# Patient Record
Sex: FEMALE | Race: WHITE | NOT HISPANIC OR LATINO
[De-identification: names, ages, dates, MRNs, and addresses within clinical notes are randomized per-mention and may not be internally consistent; named-entity substitution may affect disease eponyms.]

---

## 2018-10-19 ENCOUNTER — TRANSCRIPTION ENCOUNTER (OUTPATIENT)
Age: 83
End: 2018-10-19

## 2018-10-20 ENCOUNTER — EMERGENCY (EMERGENCY)
Facility: HOSPITAL | Age: 83
LOS: 1 days | Discharge: HOME | End: 2018-10-20
Attending: EMERGENCY MEDICINE
Payer: MEDICARE

## 2018-10-20 ENCOUNTER — TRANSCRIPTION ENCOUNTER (OUTPATIENT)
Age: 83
End: 2018-10-20

## 2018-10-20 VITALS
TEMPERATURE: 98 F | RESPIRATION RATE: 18 BRPM | SYSTOLIC BLOOD PRESSURE: 177 MMHG | DIASTOLIC BLOOD PRESSURE: 77 MMHG | WEIGHT: 134.92 LBS | HEIGHT: 62.5 IN | OXYGEN SATURATION: 97 % | HEART RATE: 95 BPM

## 2018-10-20 DIAGNOSIS — Z98.89 OTHER SPECIFIED POSTPROCEDURAL STATES: Chronic | ICD-10-CM

## 2018-10-20 DIAGNOSIS — Z98.41 CATARACT EXTRACTION STATUS, RIGHT EYE: Chronic | ICD-10-CM

## 2018-10-20 PROCEDURE — 99283 EMERGENCY DEPT VISIT LOW MDM: CPT | Mod: 25

## 2018-10-20 PROCEDURE — 90471 IMMUNIZATION ADMIN: CPT

## 2018-10-20 PROCEDURE — 99283 EMERGENCY DEPT VISIT LOW MDM: CPT | Mod: GC

## 2018-10-20 PROCEDURE — 90715 TDAP VACCINE 7 YRS/> IM: CPT

## 2018-10-20 RX ORDER — ATORVASTATIN CALCIUM 80 MG/1
1 TABLET, FILM COATED ORAL
Qty: 0 | Refills: 0 | COMMUNITY

## 2018-10-20 RX ORDER — TETANUS TOXOID, REDUCED DIPHTHERIA TOXOID AND ACELLULAR PERTUSSIS VACCINE, ADSORBED 5; 2.5; 8; 8; 2.5 [IU]/.5ML; [IU]/.5ML; UG/.5ML; UG/.5ML; UG/.5ML
0.5 SUSPENSION INTRAMUSCULAR ONCE
Qty: 0 | Refills: 0 | Status: COMPLETED | OUTPATIENT
Start: 2018-10-20 | End: 2018-10-20

## 2018-10-20 RX ORDER — MULTIVIT-MIN/FERROUS GLUCONATE 9 MG/15 ML
1 LIQUID (ML) ORAL
Qty: 0 | Refills: 0 | COMMUNITY

## 2018-10-20 RX ORDER — CLOPIDOGREL BISULFATE 75 MG/1
1 TABLET, FILM COATED ORAL
Qty: 0 | Refills: 0 | COMMUNITY

## 2018-10-20 RX ADMIN — TETANUS TOXOID, REDUCED DIPHTHERIA TOXOID AND ACELLULAR PERTUSSIS VACCINE, ADSORBED 0.5 MILLILITER(S): 5; 2.5; 8; 8; 2.5 SUSPENSION INTRAMUSCULAR at 19:54

## 2018-10-20 NOTE — ED PROVIDER NOTE - PROGRESS NOTE DETAILS
Kevin Arnett DO PGY1 - Nonbleeding wound. Surgicel and bacitracin placed on top of wound and gauze applied. Pt educated to change dressing everyday and keep a layer of ointment on skin to prevent tearing open wound again.

## 2018-10-20 NOTE — ED PROVIDER NOTE - OBJECTIVE STATEMENT
85yo F hx mitral valve replacement on plavix here after scraping her arm on a door lock yesterday. Pt went to urgent care d/t concern of it not stopping bleeding and was given surgicel?? and a mupirocin ointment to put around the area. Pt states she went to take the bandage off today and it reopened the wound and it started bleeding again. Otherwise no lightheadedness, cp, sob, dark stools, or any other complaints. 85yo F hx mitral valve replacement on plavix here after scraping her arm on a door lock yesterday. Pt went to urgent care d/t concern of it not stopping bleeding and was given surgicel?? and a mupirocin ointment to put around the area. Pt states she went to take the bandage off today and it reopened the wound and it started bleeding again. Otherwise no lightheadedness, cp, sob, dark stools, or any other complaints.    Attendinyo female presents with bleeding from skin tear on left forearm.  Was seen at urgent care and had wound dressed yesterday.  today had bleeding when she changed the dressing.  no pain

## 2018-10-20 NOTE — ED PROVIDER NOTE - PLAN OF CARE
My diagnosis was explained to me by Dr. Arnett. Rest, drink plenty of fluids.  Advance activity as tolerated.  Continue all previously prescribed medications as directed.  Follow up with your primary care physician in 24-48 hours- bring copies of your results if you were given. If you do not have a primary care physician, call our clinic at [phone], or call [phone].  Return to the Emergency Department for worsening or persistent symptoms OR ANY NEW OR CONCERNING SYMPTOMS including but not limited to saturating the dressing with blood, lightheadedness, chest pain, shortness fo breath, or dark stools. PLEASE follow up in 24-48 hours as discussed to have reevaluation of wound.

## 2018-10-20 NOTE — ED PROVIDER NOTE - CARE PLAN
Principal Discharge DX:	Bleeding from wound  Assessment and plan of treatment:	My diagnosis was explained to me by Dr. Arnett. Rest, drink plenty of fluids.  Advance activity as tolerated.  Continue all previously prescribed medications as directed.  Follow up with your primary care physician in 24-48 hours- bring copies of your results if you were given. If you do not have a primary care physician, call our clinic at [phone], or call [phone].  Return to the Emergency Department for worsening or persistent symptoms OR ANY NEW OR CONCERNING SYMPTOMS including but not limited to saturating the dressing with blood, lightheadedness, chest pain, shortness fo breath, or dark stools. PLEASE follow up in 24-48 hours as discussed to have reevaluation of wound.

## 2018-10-20 NOTE — ED ADULT NURSE NOTE - PSH
H/O cataract extraction, right    H/O colonoscopy    H/O mitral valve repair  Bioprosthetic mitral valve reolacement.  History of tonsillectomy    S/P thoracentesis

## 2018-10-20 NOTE — ED ADULT NURSE REASSESSMENT NOTE - NS ED NURSE REASSESS COMMENT FT1
Dressing removed by MD, skin tear of left forearm, approx 1cm, no bleeding, no discharge. Wound is being redressed at this time.

## 2018-10-20 NOTE — ED ADULT NURSE NOTE - OBJECTIVE STATEMENT
83 y/o female, a&o x3, c/o skin tear of left forearm yesterday, scraped on cabinet latch in bathroom. Pt takes plavix. Treated at urgent care twice, bleeding returned. Bleeding controlled in triage with pressure dressing. Unsure of last tetanus shot. Denies headache, weakness, dizziness, fevers, chills, or chest pains. Breathing even, full, unlabored, no cough, no SOB. No nausea/vomiting. Pressure dressing of left forearm in place, bleeding controlled, brisk cap refill and strong distal pulses. Stretcher locked in low position. Advised of plan of care. 83 y/o female, a&o x3, c/o skin tear of left forearm yesterday, scraped on door latch in bathroom. Pt takes plavix. Treated at urgent care twice, bleeding returned. Bleeding controlled in triage with pressure dressing. Unsure of last tetanus shot. Denies headache, weakness, dizziness, fevers, chills, or chest pains. Breathing even, full, unlabored, no cough, no SOB. No nausea/vomiting. Pressure dressing of left forearm in place, bleeding controlled, brisk cap refill and strong distal pulses. Stretcher locked in low position. Advised of plan of care. 85 y/o female, a&o x3, c/o skin tear of left forearm yesterday, scraped on door latch in bathroom. Pt takes plavix. Treated at urgent care twice, bleeding returned, prescribed mupirocin 2% ointment. Bleeding controlled in triage with pressure dressing. Unsure of last tetanus shot. Denies headache, weakness, dizziness, fevers, chills, or chest pains. Breathing even, full, unlabored, no cough, no SOB. No nausea/vomiting. Pressure dressing of left forearm in place, bleeding controlled, brisk cap refill and strong distal pulses. Stretcher locked in low position. Advised of plan of care.

## 2018-10-20 NOTE — ED PROVIDER NOTE - MEDICAL DECISION MAKING DETAILS
Bleeding wound nonbleeding currently. No signs of anemia. Change dressing and educate on how to change dressing without reopening wound again.
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
## 2018-10-20 NOTE — ED PROVIDER NOTE - PHYSICAL EXAMINATION
Gen: Well appearing, NAD  Head: NCAT  HEENT: PERRL, MMM, normal conjunctiva, anicteric, neck supple  Lung: CTAB, no adventitious sounds  CV: RRR, no murmurs  Abd: soft, NTND, no rebound or guarding, no CVAT  MSK: No edema, no visible deformities  Neuro: CN II-XII grossly intact. 5/5 strength and normal sensation in all extremities. Ambulatory with stable gait.   Skin: 1cm triangular skin flap with nonbleeding wound and bloody but not saturated dressing on L forearm.   Psych: normal mood and affect

## 2018-10-21 ENCOUNTER — EMERGENCY (EMERGENCY)
Facility: HOSPITAL | Age: 83
LOS: 1 days | Discharge: HOME | End: 2018-10-21
Attending: EMERGENCY MEDICINE
Payer: MEDICARE

## 2018-10-21 VITALS
TEMPERATURE: 98 F | WEIGHT: 134.92 LBS | HEIGHT: 62 IN | OXYGEN SATURATION: 98 % | SYSTOLIC BLOOD PRESSURE: 195 MMHG | DIASTOLIC BLOOD PRESSURE: 80 MMHG | RESPIRATION RATE: 19 BRPM | HEART RATE: 80 BPM

## 2018-10-21 DIAGNOSIS — Z98.41 CATARACT EXTRACTION STATUS, RIGHT EYE: Chronic | ICD-10-CM

## 2018-10-21 DIAGNOSIS — Z98.89 OTHER SPECIFIED POSTPROCEDURAL STATES: Chronic | ICD-10-CM

## 2018-10-21 PROCEDURE — G0463: CPT

## 2018-10-21 NOTE — ED ADULT TRIAGE NOTE - CHIEF COMPLAINT QUOTE
L forearm skin tear (hit arm against door), was seen here --> given tdap, "afraid to take off dressing"

## 2018-10-21 NOTE — ED PROVIDER NOTE - OBJECTIVE STATEMENT
84 year old F Anxiety, Bronchitis, Chronic congestive heart failure, Depression, Diverticulosis presents for wound check s/p sustaining abrasion to L lateral forearm yesterday. Patient has not changed dressing. Denies any fevers, chills, bleeding through wrap. Tdap updated yesterday.

## 2018-10-21 NOTE — ED PROVIDER NOTE - MEDICAL DECISION MAKING DETAILS
84 year old F PMH non-contributory presenting for wound check of L forearm. Looks well. Will re-dress. D/c to home with return precautions. 84 year old F PMH non-contributory presenting for wound check of L forearm. Looks well. Will re-dress. D/c to home with return precautions.  --BMM

## 2018-10-21 NOTE — ED ADULT NURSE NOTE - OBJECTIVE STATEMENT
pt states, 'I banged my left arm on the door lock yesterday and I got bandaged up and it looked good. I'm coming in to get it looked at." no active bleeding noted at present. pt denies any pain, itchiness, or d/c from wound.

## 2018-10-21 NOTE — ED PROVIDER NOTE - PLAN OF CARE
You were seen today for a wound check. Please keep this dressing on for 24 hours. You can then remove it and put a band aid to avoid disturbing the wound. Please put on the mupirocin 3 times per day for 5 days.   If you notice purulent drainage from wound, develop redness tracking up or down the arm, develop fevers or chills, please come back to the emergency room.

## 2018-10-21 NOTE — ED PROVIDER NOTE - NS ED ROS FT
General: denies fever, chills.  HENT: denies nasal congestion, sore throat, rhinorrhea, ear pain  Eyes: denies visual changes  Neck: denies neck pain, neck swelling  CV: denies chest pain, palpitations  Resp: denies difficulty breathing, cough  Abdominal: denies nausea, vomiting, diarrhea, abdominal pain  MSK: denies muscle aches, bony pain, leg pain, leg swelling  Neuro: denies headaches, numbness, tingling, dizziness, lightheadedness.  Skin: +abrasion

## 2018-10-21 NOTE — ED PROVIDER NOTE - CARE PLAN
Principal Discharge DX:	Visit for wound check  Assessment and plan of treatment:	You were seen today for a wound check. Please keep this dressing on for 24 hours. You can then remove it and put a band aid to avoid disturbing the wound. Please put on the mupirocin 3 times per day for 5 days.   If you notice purulent drainage from wound, develop redness tracking up or down the arm, develop fevers or chills, please come back to the emergency room.

## 2018-10-21 NOTE — ED PROVIDER NOTE - PHYSICAL EXAMINATION
General appearance: NAD, conversant, afebrile   Eyes: anicteric sclerae, moist conjunctivae; no lid-lag; Pupils equal, round and reactive to light; Extraocular muscles intact  HENT: Atraumatic;    Extremities: L forearm: +1.2 cm triangular abrasion, well approximated. No active bleeding. No surrounding erythema or purulent drainage from wound.    Skin: See skin exam.

## 2020-07-22 ENCOUNTER — APPOINTMENT (OUTPATIENT)
Dept: OPHTHALMOLOGY | Facility: CLINIC | Age: 85
End: 2020-07-22
Payer: MEDICARE

## 2020-07-22 ENCOUNTER — NON-APPOINTMENT (OUTPATIENT)
Age: 85
End: 2020-07-22

## 2020-07-22 PROCEDURE — 99212 OFFICE O/P EST SF 10 MIN: CPT | Mod: 95

## 2020-08-28 ENCOUNTER — NON-APPOINTMENT (OUTPATIENT)
Age: 85
End: 2020-08-28

## 2020-08-28 ENCOUNTER — APPOINTMENT (OUTPATIENT)
Dept: OPHTHALMOLOGY | Facility: CLINIC | Age: 85
End: 2020-08-28
Payer: MEDICARE

## 2020-08-28 PROCEDURE — 99212 OFFICE O/P EST SF 10 MIN: CPT | Mod: 95

## 2020-09-25 ENCOUNTER — NON-APPOINTMENT (OUTPATIENT)
Age: 85
End: 2020-09-25

## 2020-09-25 ENCOUNTER — APPOINTMENT (OUTPATIENT)
Dept: OPHTHALMOLOGY | Facility: CLINIC | Age: 85
End: 2020-09-25
Payer: MEDICARE

## 2020-09-25 PROCEDURE — 92012 INTRM OPH EXAM EST PATIENT: CPT

## 2020-12-15 ENCOUNTER — APPOINTMENT (OUTPATIENT)
Dept: OPHTHALMOLOGY | Facility: CLINIC | Age: 85
End: 2020-12-15

## 2021-07-18 ENCOUNTER — EMERGENCY (EMERGENCY)
Facility: HOSPITAL | Age: 86
LOS: 1 days | Discharge: HOME | End: 2021-07-18
Attending: EMERGENCY MEDICINE
Payer: MEDICARE

## 2021-07-18 VITALS
HEART RATE: 85 BPM | TEMPERATURE: 98 F | SYSTOLIC BLOOD PRESSURE: 193 MMHG | WEIGHT: 141.98 LBS | OXYGEN SATURATION: 98 % | RESPIRATION RATE: 18 BRPM | DIASTOLIC BLOOD PRESSURE: 82 MMHG | HEIGHT: 62 IN

## 2021-07-18 DIAGNOSIS — Z98.89 OTHER SPECIFIED POSTPROCEDURAL STATES: Chronic | ICD-10-CM

## 2021-07-18 DIAGNOSIS — Z98.41 CATARACT EXTRACTION STATUS, RIGHT EYE: Chronic | ICD-10-CM

## 2021-07-18 LAB
ALBUMIN SERPL ELPH-MCNC: 4.6 G/DL — SIGNIFICANT CHANGE UP (ref 3.3–5)
ALP SERPL-CCNC: 123 U/L — HIGH (ref 40–120)
ALT FLD-CCNC: 27 U/L — SIGNIFICANT CHANGE UP (ref 10–45)
ANION GAP SERPL CALC-SCNC: 12 MMOL/L — SIGNIFICANT CHANGE UP (ref 5–17)
APTT BLD: 37 SEC — HIGH (ref 27.5–35.5)
AST SERPL-CCNC: 25 U/L — SIGNIFICANT CHANGE UP (ref 10–40)
BASOPHILS # BLD AUTO: 0.02 K/UL — SIGNIFICANT CHANGE UP (ref 0–0.2)
BASOPHILS NFR BLD AUTO: 0.3 % — SIGNIFICANT CHANGE UP (ref 0–2)
BILIRUB SERPL-MCNC: 0.7 MG/DL — SIGNIFICANT CHANGE UP (ref 0.2–1.2)
BUN SERPL-MCNC: 12 MG/DL — SIGNIFICANT CHANGE UP (ref 7–23)
CALCIUM SERPL-MCNC: 9.2 MG/DL — SIGNIFICANT CHANGE UP (ref 8.4–10.5)
CHLORIDE SERPL-SCNC: 90 MMOL/L — LOW (ref 96–108)
CO2 SERPL-SCNC: 26 MMOL/L — SIGNIFICANT CHANGE UP (ref 22–31)
CREAT SERPL-MCNC: 0.6 MG/DL — SIGNIFICANT CHANGE UP (ref 0.5–1.3)
EOSINOPHIL # BLD AUTO: 0.08 K/UL — SIGNIFICANT CHANGE UP (ref 0–0.5)
EOSINOPHIL NFR BLD AUTO: 1 % — SIGNIFICANT CHANGE UP (ref 0–6)
GLUCOSE SERPL-MCNC: 127 MG/DL — HIGH (ref 70–99)
HCT VFR BLD CALC: 38.2 % — SIGNIFICANT CHANGE UP (ref 34.5–45)
HGB BLD-MCNC: 12.9 G/DL — SIGNIFICANT CHANGE UP (ref 11.5–15.5)
IMM GRANULOCYTES NFR BLD AUTO: 0.4 % — SIGNIFICANT CHANGE UP (ref 0–1.5)
INR BLD: 1.02 RATIO — SIGNIFICANT CHANGE UP (ref 0.88–1.16)
LYMPHOCYTES # BLD AUTO: 0.89 K/UL — LOW (ref 1–3.3)
LYMPHOCYTES # BLD AUTO: 11.5 % — LOW (ref 13–44)
MCHC RBC-ENTMCNC: 31.1 PG — SIGNIFICANT CHANGE UP (ref 27–34)
MCHC RBC-ENTMCNC: 33.8 GM/DL — SIGNIFICANT CHANGE UP (ref 32–36)
MCV RBC AUTO: 92 FL — SIGNIFICANT CHANGE UP (ref 80–100)
MONOCYTES # BLD AUTO: 0.58 K/UL — SIGNIFICANT CHANGE UP (ref 0–0.9)
MONOCYTES NFR BLD AUTO: 7.5 % — SIGNIFICANT CHANGE UP (ref 2–14)
NEUTROPHILS # BLD AUTO: 6.16 K/UL — SIGNIFICANT CHANGE UP (ref 1.8–7.4)
NEUTROPHILS NFR BLD AUTO: 79.3 % — HIGH (ref 43–77)
NRBC # BLD: 0 /100 WBCS — SIGNIFICANT CHANGE UP (ref 0–0)
NT-PROBNP SERPL-SCNC: 861 PG/ML — HIGH (ref 0–300)
PLATELET # BLD AUTO: 273 K/UL — SIGNIFICANT CHANGE UP (ref 150–400)
POTASSIUM SERPL-MCNC: 4.3 MMOL/L — SIGNIFICANT CHANGE UP (ref 3.5–5.3)
POTASSIUM SERPL-SCNC: 4.3 MMOL/L — SIGNIFICANT CHANGE UP (ref 3.5–5.3)
PROT SERPL-MCNC: 7.7 G/DL — SIGNIFICANT CHANGE UP (ref 6–8.3)
PROTHROM AB SERPL-ACNC: 12.2 SEC — SIGNIFICANT CHANGE UP (ref 10.6–13.6)
RBC # BLD: 4.15 M/UL — SIGNIFICANT CHANGE UP (ref 3.8–5.2)
RBC # FLD: 12.4 % — SIGNIFICANT CHANGE UP (ref 10.3–14.5)
SODIUM SERPL-SCNC: 128 MMOL/L — LOW (ref 135–145)
TROPONIN T, HIGH SENSITIVITY RESULT: 13 NG/L — SIGNIFICANT CHANGE UP (ref 0–51)
WBC # BLD: 7.76 K/UL — SIGNIFICANT CHANGE UP (ref 3.8–10.5)
WBC # FLD AUTO: 7.76 K/UL — SIGNIFICANT CHANGE UP (ref 3.8–10.5)

## 2021-07-18 PROCEDURE — 71046 X-RAY EXAM CHEST 2 VIEWS: CPT | Mod: 26

## 2021-07-18 PROCEDURE — 99285 EMERGENCY DEPT VISIT HI MDM: CPT | Mod: GC

## 2021-07-18 RX ORDER — ACETAMINOPHEN 500 MG
650 TABLET ORAL ONCE
Refills: 0 | Status: DISCONTINUED | OUTPATIENT
Start: 2021-07-18 | End: 2021-07-22

## 2021-07-18 RX ORDER — LANOLIN ALCOHOL/MO/W.PET/CERES
5 CREAM (GRAM) TOPICAL ONCE
Refills: 0 | Status: COMPLETED | OUTPATIENT
Start: 2021-07-18 | End: 2021-07-18

## 2021-07-18 NOTE — ED PROVIDER NOTE - NS ED ROS FT
GENERAL: No fever or chills  EYES: No change in vision  HEENT: No trouble swallowing or speaking  CARDIAC: No chest pain  PULMONARY: No cough or SOB  GI: No abdominal pain, no nausea or no vomiting, no diarrhea or constipation  : No changes in urination  SKIN: No rashes  NEURO: No headache, no numbness  MSK: No joint pain  PSYCH: +anxiety, +agitation  Otherwise as HPI or negative.

## 2021-07-18 NOTE — ED PROVIDER NOTE - PHYSICAL EXAMINATION
Gen: In mild distress. A&Ox4. Non-toxic appearing.  HEENT: Normocephalic and atraumatic. PERRL, EOMI, no nasal discharge, mucous membranes moist, no scleral icterus.  CV: Regular rate and rhythm, +S1/S2, no M/R/G. No significant lower extremity edema. Radial and DP pulses present and symmetrical. Capillary refill less than 2 seconds.  Resp: Normal effort and rate. CTAB, no rales, rhonchi, or wheezes.  GI: Abdomen soft, non-distended, non tender to palpation. No masses appreciated. Bowel sounds present.  MSK: No open wounds and no bruising. No CVA tenderness bilaterally.  Neuro: Following commands, speaking in full sentences, moving extremities spontaneously. CN II-XII intact. Strength and sensation symmetric and intact throughout. Reflexes 2+ throughout. Cerebellar testing normal.  Psych: Anxious mood, cooperative

## 2021-07-18 NOTE — ED PROVIDER NOTE - NSFOLLOWUPINSTRUCTIONS_ED_ALL_ED_FT
Please stop taking the Zoloft until you see your primary care physician. It is possible that the medicine is making you feel more anxious and agitated.    Please followup with your primary care physician as soon as possible to discuss your medications.    Your sodium is a bit low (127). Please continue to stay hydrated and drink lots of fluids at home.    Anxiety    Generalized anxiety disorder (AR) is a mental disorder. It is defined as anxiety that is not necessarily related to specific events or activities or is out of proportion to said events. Symptoms include restlessness, fatigue, difficulty concentrations, irritability and difficulty concentrating. It may interfere with life functions, including relationships, work, and school. If you were started on a medication, make sure to take exactly as prescribed and follow up with a psychiatrist.    SEEK IMMEDIATE MEDICAL CARE IF YOU HAVE ANY OF THE FOLLOWING SYMPTOMS: thoughts about hurting killing yourself, thoughts about hurting or killing somebody else, hallucinations, or worsening depression.

## 2021-07-18 NOTE — ED PROVIDER NOTE - OBJECTIVE STATEMENT
The patient is a 87y Female with pmhx of paroxysmal afib, hyponatremia, mitral valve stenosis with repair, HTN, and CHF complaining of anxiety. Says that she was started on Zoloft by her PMD 2 days ago for worsening anxiety. Today, around 5 pm she started to feel very anxious and agitate and "didn't feel right". So she came to ED for further eval. Denies any CP, SOB, abd pain, leg swelling, fevers, palpitations. Denies any SI, HI, or hallucinations. Allergic to doxy and PCN.

## 2021-07-18 NOTE — ED PROVIDER NOTE - CLINICAL SUMMARY MEDICAL DECISION MAKING FREE TEXT BOX
YESSICA Acosta MD: Pt is an 86 y/o female with PMH anxiety, depression, CHF, HTN, HLD, hyponatremia, PAF on ASA and plavix, pulmonary HTN, PMR, mitral valve stenosis who is BIB son for increased anxiety and discomfort to her head, 1 day s/p starting zoloft. Pt states that today, she began to feel a band-like pressure around her head, not a pain. No trauma. No FNDs. No f/c. Suspect medication s/e as cause of her sx, however, will check basic labs, u/a, ucx, CTH and will reassess. Will likely recommend d/c zoloft and f/u with PCP for further mgt of her anxiety

## 2021-07-18 NOTE — ED ADULT NURSE NOTE - OBJECTIVE STATEMENT
87 y f presents BIBEMS with "anxiety x 2 hours," PTA. Reports to being started on Zoloft- 3 days in. Reports to "weird feeling throughout body." Denies CP, SOB, N/V/D, pain/ difficulty walking/ speaking. A&OX3. Skin warm dry and intact. Breathing comfortably in bed- no distress. Abdomen soft nondistended. Safety maintained- bed locked in lowest position.

## 2021-07-18 NOTE — ED PROVIDER NOTE - PATIENT PORTAL LINK FT
You can access the FollowMyHealth Patient Portal offered by [facility] by registering at the following website: [email or web address]. By joining [facility]’s FollowMyHealth portal, you will also be able to view your health information using other applications (apps) compatible with our system.

## 2021-07-19 VITALS
HEART RATE: 78 BPM | SYSTOLIC BLOOD PRESSURE: 123 MMHG | OXYGEN SATURATION: 97 % | RESPIRATION RATE: 20 BRPM | DIASTOLIC BLOOD PRESSURE: 56 MMHG | TEMPERATURE: 98 F

## 2021-07-19 LAB
ANION GAP SERPL CALC-SCNC: 11 MMOL/L — SIGNIFICANT CHANGE UP (ref 5–17)
APPEARANCE UR: CLEAR — SIGNIFICANT CHANGE UP
BACTERIA # UR AUTO: NEGATIVE — SIGNIFICANT CHANGE UP
BILIRUB UR-MCNC: NEGATIVE — SIGNIFICANT CHANGE UP
BUN SERPL-MCNC: 11 MG/DL — SIGNIFICANT CHANGE UP (ref 7–23)
CALCIUM SERPL-MCNC: 8.7 MG/DL — SIGNIFICANT CHANGE UP (ref 8.4–10.5)
CHLORIDE SERPL-SCNC: 93 MMOL/L — LOW (ref 96–108)
CO2 SERPL-SCNC: 23 MMOL/L — SIGNIFICANT CHANGE UP (ref 22–31)
COLOR SPEC: COLORLESS — SIGNIFICANT CHANGE UP
CREAT SERPL-MCNC: 0.53 MG/DL — SIGNIFICANT CHANGE UP (ref 0.5–1.3)
DIFF PNL FLD: NEGATIVE — SIGNIFICANT CHANGE UP
EPI CELLS # UR: 0 /HPF — SIGNIFICANT CHANGE UP
GLUCOSE SERPL-MCNC: 117 MG/DL — HIGH (ref 70–99)
GLUCOSE UR QL: NEGATIVE — SIGNIFICANT CHANGE UP
KETONES UR-MCNC: NEGATIVE — SIGNIFICANT CHANGE UP
LEUKOCYTE ESTERASE UR-ACNC: NEGATIVE — SIGNIFICANT CHANGE UP
NITRITE UR-MCNC: NEGATIVE — SIGNIFICANT CHANGE UP
PH UR: 7.5 — SIGNIFICANT CHANGE UP (ref 5–8)
POTASSIUM SERPL-MCNC: 4 MMOL/L — SIGNIFICANT CHANGE UP (ref 3.5–5.3)
POTASSIUM SERPL-SCNC: 4 MMOL/L — SIGNIFICANT CHANGE UP (ref 3.5–5.3)
PROT UR-MCNC: NEGATIVE — SIGNIFICANT CHANGE UP
RBC CASTS # UR COMP ASSIST: 0 /HPF — SIGNIFICANT CHANGE UP (ref 0–4)
SARS-COV-2 RNA SPEC QL NAA+PROBE: SIGNIFICANT CHANGE UP
SODIUM SERPL-SCNC: 127 MMOL/L — LOW (ref 135–145)
SP GR SPEC: 1 — LOW (ref 1.01–1.02)
TROPONIN T, HIGH SENSITIVITY RESULT: 12 NG/L — SIGNIFICANT CHANGE UP (ref 0–51)
TSH SERPL-MCNC: 3 UIU/ML — SIGNIFICANT CHANGE UP (ref 0.27–4.2)
UROBILINOGEN FLD QL: NEGATIVE — SIGNIFICANT CHANGE UP
WBC UR QL: 2 /HPF — SIGNIFICANT CHANGE UP (ref 0–5)

## 2021-07-19 PROCEDURE — 99284 EMERGENCY DEPT VISIT MOD MDM: CPT | Mod: 25

## 2021-07-19 PROCEDURE — 93005 ELECTROCARDIOGRAM TRACING: CPT

## 2021-07-19 PROCEDURE — 70450 CT HEAD/BRAIN W/O DYE: CPT

## 2021-07-19 PROCEDURE — 84484 ASSAY OF TROPONIN QUANT: CPT

## 2021-07-19 PROCEDURE — 84443 ASSAY THYROID STIM HORMONE: CPT

## 2021-07-19 PROCEDURE — U0003: CPT

## 2021-07-19 PROCEDURE — 85610 PROTHROMBIN TIME: CPT

## 2021-07-19 PROCEDURE — 80048 BASIC METABOLIC PNL TOTAL CA: CPT

## 2021-07-19 PROCEDURE — 80053 COMPREHEN METABOLIC PANEL: CPT

## 2021-07-19 PROCEDURE — 71046 X-RAY EXAM CHEST 2 VIEWS: CPT

## 2021-07-19 PROCEDURE — 70450 CT HEAD/BRAIN W/O DYE: CPT | Mod: 26,MA

## 2021-07-19 PROCEDURE — 83880 ASSAY OF NATRIURETIC PEPTIDE: CPT

## 2021-07-19 PROCEDURE — 87086 URINE CULTURE/COLONY COUNT: CPT

## 2021-07-19 PROCEDURE — 81001 URINALYSIS AUTO W/SCOPE: CPT

## 2021-07-19 PROCEDURE — 85730 THROMBOPLASTIN TIME PARTIAL: CPT

## 2021-07-19 PROCEDURE — 85025 COMPLETE CBC W/AUTO DIFF WBC: CPT

## 2021-07-19 RX ORDER — SODIUM CHLORIDE 9 MG/ML
1000 INJECTION INTRAMUSCULAR; INTRAVENOUS; SUBCUTANEOUS ONCE
Refills: 0 | Status: COMPLETED | OUTPATIENT
Start: 2021-07-19 | End: 2021-07-19

## 2021-07-19 RX ADMIN — SODIUM CHLORIDE 1000 MILLILITER(S): 9 INJECTION INTRAMUSCULAR; INTRAVENOUS; SUBCUTANEOUS at 01:04

## 2021-07-19 RX ADMIN — Medication 5 MILLIGRAM(S): at 01:04

## 2021-07-20 LAB
CULTURE RESULTS: SIGNIFICANT CHANGE UP
SPECIMEN SOURCE: SIGNIFICANT CHANGE UP

## 2021-07-20 NOTE — PROGRESS NOTE ADULT - ASSESSMENT
Assessment/plan:    Ingrown/incurvated toenails all 10 digits  Tyloma left heel    Aseptic debridement of ingrown/incurvated toenails all 10 digits with Betadine applied.  Aseptic excisional debridement with #15 blade of tyloma left heel with bacitracin applied.  Recommend continued routine podiatric foot care as an outpatient.  Thank you for consultation
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
## 2021-07-30 ENCOUNTER — NON-APPOINTMENT (OUTPATIENT)
Age: 86
End: 2021-07-30

## 2021-07-30 ENCOUNTER — APPOINTMENT (OUTPATIENT)
Dept: OPHTHALMOLOGY | Facility: CLINIC | Age: 86
End: 2021-07-30
Payer: MEDICARE

## 2021-07-30 PROCEDURE — 92012 INTRM OPH EXAM EST PATIENT: CPT

## 2021-08-27 ENCOUNTER — NON-APPOINTMENT (OUTPATIENT)
Age: 86
End: 2021-08-27

## 2021-08-27 ENCOUNTER — APPOINTMENT (OUTPATIENT)
Dept: OPHTHALMOLOGY | Facility: CLINIC | Age: 86
End: 2021-08-27
Payer: MEDICARE

## 2021-08-27 PROCEDURE — 92012 INTRM OPH EXAM EST PATIENT: CPT

## 2021-09-24 ENCOUNTER — NON-APPOINTMENT (OUTPATIENT)
Age: 86
End: 2021-09-24

## 2021-09-24 ENCOUNTER — APPOINTMENT (OUTPATIENT)
Dept: OPHTHALMOLOGY | Facility: CLINIC | Age: 86
End: 2021-09-24
Payer: MEDICARE

## 2021-09-24 PROCEDURE — 92012 INTRM OPH EXAM EST PATIENT: CPT

## 2021-11-23 ENCOUNTER — NON-APPOINTMENT (OUTPATIENT)
Age: 86
End: 2021-11-23

## 2021-11-23 ENCOUNTER — APPOINTMENT (OUTPATIENT)
Dept: OPHTHALMOLOGY | Facility: CLINIC | Age: 86
End: 2021-11-23
Payer: MEDICARE

## 2021-11-23 PROCEDURE — 92012 INTRM OPH EXAM EST PATIENT: CPT

## 2022-03-03 ENCOUNTER — PREPPED CHART (OUTPATIENT)
Dept: URBAN - METROPOLITAN AREA CLINIC 91 | Facility: CLINIC | Age: 87
End: 2022-03-03

## 2022-03-28 ASSESSMENT — TONOMETRY: OD_IOP_MMHG: 14

## 2022-03-28 ASSESSMENT — VISUAL ACUITY
OD_PH: 20/40-2
OD_SC: 20/50-2

## 2022-03-31 ENCOUNTER — FOLLOW UP (OUTPATIENT)
Dept: URBAN - METROPOLITAN AREA CLINIC 91 | Facility: CLINIC | Age: 87
End: 2022-03-31

## 2022-03-31 DIAGNOSIS — H35.3122: ICD-10-CM

## 2022-03-31 DIAGNOSIS — H34.8320: ICD-10-CM

## 2022-03-31 DIAGNOSIS — H35.3211: ICD-10-CM

## 2022-03-31 PROCEDURE — 92014 COMPRE OPH EXAM EST PT 1/>: CPT | Mod: 25

## 2022-03-31 PROCEDURE — PFS EYLEA PFS

## 2022-03-31 PROCEDURE — 67028 INJECTION EYE DRUG: CPT

## 2022-03-31 PROCEDURE — 92134 CPTRZ OPH DX IMG PST SGM RTA: CPT

## 2022-03-31 PROCEDURE — 92202 OPSCPY EXTND ON/MAC DRAW: CPT | Mod: 59,LT

## 2022-03-31 ASSESSMENT — TONOMETRY
OS_IOP_MMHG: 15
OD_IOP_MMHG: 20

## 2022-03-31 ASSESSMENT — VISUAL ACUITY
OD_SC: 20/50
OS_SC: 20/30+2

## 2022-05-19 ENCOUNTER — FOLLOW UP (OUTPATIENT)
Dept: URBAN - METROPOLITAN AREA CLINIC 91 | Facility: CLINIC | Age: 87
End: 2022-05-19

## 2022-05-19 DIAGNOSIS — H43.393: ICD-10-CM

## 2022-05-19 DIAGNOSIS — H35.3122: ICD-10-CM

## 2022-05-19 DIAGNOSIS — H35.3211: ICD-10-CM

## 2022-05-19 DIAGNOSIS — H34.8320: ICD-10-CM

## 2022-05-19 DIAGNOSIS — D31.31: ICD-10-CM

## 2022-05-19 DIAGNOSIS — H35.61: ICD-10-CM

## 2022-05-19 PROCEDURE — 92134 CPTRZ OPH DX IMG PST SGM RTA: CPT

## 2022-05-19 PROCEDURE — 92014 COMPRE OPH EXAM EST PT 1/>: CPT | Mod: 25

## 2022-05-19 PROCEDURE — 67028 INJECTION EYE DRUG: CPT

## 2022-05-19 PROCEDURE — PFS EYLEA PFS

## 2022-05-19 PROCEDURE — 92202 OPSCPY EXTND ON/MAC DRAW: CPT | Mod: 59

## 2022-05-19 ASSESSMENT — VISUAL ACUITY
OD_PH: 20/80
OS_PH: 20/40+2
OS_SC: 20/50-1
OS_CC: 20/40-1
OD_SC: 20/100

## 2022-05-19 ASSESSMENT — TONOMETRY
OS_IOP_MMHG: 14
OD_IOP_MMHG: 14

## 2022-05-24 ENCOUNTER — PROBLEM (OUTPATIENT)
Dept: URBAN - METROPOLITAN AREA CLINIC 91 | Facility: CLINIC | Age: 87
End: 2022-05-24

## 2022-05-24 DIAGNOSIS — H34.8320: ICD-10-CM

## 2022-05-24 DIAGNOSIS — H35.3122: ICD-10-CM

## 2022-05-24 DIAGNOSIS — H43.393: ICD-10-CM

## 2022-05-24 DIAGNOSIS — H35.3211: ICD-10-CM

## 2022-05-24 DIAGNOSIS — D31.31: ICD-10-CM

## 2022-05-24 DIAGNOSIS — H35.61: ICD-10-CM

## 2022-05-24 PROCEDURE — 92014 COMPRE OPH EXAM EST PT 1/>: CPT

## 2022-05-24 PROCEDURE — 92201 OPSCPY EXTND RTA DRAW UNI/BI: CPT | Mod: 59

## 2022-05-24 PROCEDURE — 92134 CPTRZ OPH DX IMG PST SGM RTA: CPT

## 2022-05-24 ASSESSMENT — VISUAL ACUITY
OS_SC: 20/30-2
OD_SC: 20/50

## 2022-05-24 ASSESSMENT — TONOMETRY
OS_IOP_MMHG: 15
OD_IOP_MMHG: 15

## 2022-06-29 ENCOUNTER — FOLLOW UP (OUTPATIENT)
Dept: URBAN - METROPOLITAN AREA CLINIC 91 | Facility: CLINIC | Age: 87
End: 2022-06-29

## 2022-06-29 DIAGNOSIS — H43.393: ICD-10-CM

## 2022-06-29 DIAGNOSIS — H34.8320: ICD-10-CM

## 2022-06-29 DIAGNOSIS — H35.61: ICD-10-CM

## 2022-06-29 DIAGNOSIS — H35.3211: ICD-10-CM

## 2022-06-29 DIAGNOSIS — H01.003: ICD-10-CM

## 2022-06-29 DIAGNOSIS — D31.31: ICD-10-CM

## 2022-06-29 DIAGNOSIS — H01.006: ICD-10-CM

## 2022-06-29 DIAGNOSIS — H35.3122: ICD-10-CM

## 2022-06-29 PROCEDURE — 92014 COMPRE OPH EXAM EST PT 1/>: CPT | Mod: 25

## 2022-06-29 PROCEDURE — 92202 OPSCPY EXTND ON/MAC DRAW: CPT | Mod: 59

## 2022-06-29 PROCEDURE — PFS EYLEA PFS

## 2022-06-29 PROCEDURE — 92134 CPTRZ OPH DX IMG PST SGM RTA: CPT

## 2022-06-29 PROCEDURE — 67028 INJECTION EYE DRUG: CPT

## 2022-06-29 ASSESSMENT — VISUAL ACUITY
OS_SC: 20/40-1
OD_SC: 20/80

## 2022-06-29 ASSESSMENT — TONOMETRY
OD_IOP_MMHG: 18
OS_IOP_MMHG: 19

## 2022-08-04 ENCOUNTER — FOLLOW UP (OUTPATIENT)
Dept: URBAN - METROPOLITAN AREA CLINIC 91 | Facility: CLINIC | Age: 87
End: 2022-08-04

## 2022-08-04 DIAGNOSIS — H35.3122: ICD-10-CM

## 2022-08-04 DIAGNOSIS — H43.393: ICD-10-CM

## 2022-08-04 DIAGNOSIS — H34.8320: ICD-10-CM

## 2022-08-04 DIAGNOSIS — H35.3211: ICD-10-CM

## 2022-08-04 DIAGNOSIS — H35.61: ICD-10-CM

## 2022-08-04 DIAGNOSIS — D31.31: ICD-10-CM

## 2022-08-04 PROCEDURE — 92202 OPSCPY EXTND ON/MAC DRAW: CPT | Mod: 59

## 2022-08-04 PROCEDURE — 92134 CPTRZ OPH DX IMG PST SGM RTA: CPT

## 2022-08-04 PROCEDURE — PFS EYLEA PFS

## 2022-08-04 PROCEDURE — 92014 COMPRE OPH EXAM EST PT 1/>: CPT | Mod: 25

## 2022-08-04 PROCEDURE — 67028 INJECTION EYE DRUG: CPT

## 2022-08-04 ASSESSMENT — VISUAL ACUITY
OS_SC: 20/30+2
OD_SC: 20/50-1

## 2022-08-04 ASSESSMENT — TONOMETRY
OS_IOP_MMHG: 15
OD_IOP_MMHG: 13

## 2022-09-08 ENCOUNTER — FOLLOW UP (OUTPATIENT)
Dept: URBAN - METROPOLITAN AREA CLINIC 91 | Facility: CLINIC | Age: 87
End: 2022-09-08

## 2022-09-08 DIAGNOSIS — H35.3122: ICD-10-CM

## 2022-09-08 DIAGNOSIS — H35.61: ICD-10-CM

## 2022-09-08 DIAGNOSIS — H43.393: ICD-10-CM

## 2022-09-08 DIAGNOSIS — H34.8320: ICD-10-CM

## 2022-09-08 DIAGNOSIS — H35.3211: ICD-10-CM

## 2022-09-08 DIAGNOSIS — D31.31: ICD-10-CM

## 2022-09-08 PROCEDURE — 67028 INJECTION EYE DRUG: CPT

## 2022-09-08 PROCEDURE — PFS EYLEA PFS

## 2022-09-08 PROCEDURE — 92202 OPSCPY EXTND ON/MAC DRAW: CPT | Mod: 59

## 2022-09-08 PROCEDURE — 92134 CPTRZ OPH DX IMG PST SGM RTA: CPT

## 2022-09-08 PROCEDURE — 92014 COMPRE OPH EXAM EST PT 1/>: CPT | Mod: 25

## 2022-09-08 ASSESSMENT — VISUAL ACUITY
OD_SC: 20/40-2
OS_PH: 20/25
OS_SC: 20/30-1

## 2022-09-08 ASSESSMENT — TONOMETRY
OD_IOP_MMHG: 11
OS_IOP_MMHG: 7

## 2022-10-19 ENCOUNTER — FOLLOW UP (OUTPATIENT)
Dept: URBAN - METROPOLITAN AREA CLINIC 91 | Facility: CLINIC | Age: 87
End: 2022-10-19

## 2022-10-19 DIAGNOSIS — H43.393: ICD-10-CM

## 2022-10-19 DIAGNOSIS — D31.31: ICD-10-CM

## 2022-10-19 DIAGNOSIS — H35.3211: ICD-10-CM

## 2022-10-19 DIAGNOSIS — H34.8320: ICD-10-CM

## 2022-10-19 DIAGNOSIS — H35.3122: ICD-10-CM

## 2022-10-19 DIAGNOSIS — H35.61: ICD-10-CM

## 2022-10-19 PROCEDURE — 92202 OPSCPY EXTND ON/MAC DRAW: CPT | Mod: 59

## 2022-10-19 PROCEDURE — 92134 CPTRZ OPH DX IMG PST SGM RTA: CPT

## 2022-10-19 PROCEDURE — 67028 INJECTION EYE DRUG: CPT

## 2022-10-19 PROCEDURE — PFS EYLEA PFS

## 2022-10-19 PROCEDURE — 92014 COMPRE OPH EXAM EST PT 1/>: CPT | Mod: 25

## 2022-10-19 ASSESSMENT — TONOMETRY
OD_IOP_MMHG: 15
OS_IOP_MMHG: 18

## 2022-10-19 ASSESSMENT — VISUAL ACUITY
OD_SC: 20/50-2
OS_SC: 20/25-1

## 2022-11-22 ENCOUNTER — FOLLOW UP (OUTPATIENT)
Dept: URBAN - METROPOLITAN AREA CLINIC 91 | Facility: CLINIC | Age: 87
End: 2022-11-22

## 2022-11-22 DIAGNOSIS — D31.31: ICD-10-CM

## 2022-11-22 DIAGNOSIS — H35.3211: ICD-10-CM

## 2022-11-22 DIAGNOSIS — H35.61: ICD-10-CM

## 2022-11-22 DIAGNOSIS — H34.8320: ICD-10-CM

## 2022-11-22 DIAGNOSIS — H43.393: ICD-10-CM

## 2022-11-22 DIAGNOSIS — H35.3122: ICD-10-CM

## 2022-11-22 PROCEDURE — 92134 CPTRZ OPH DX IMG PST SGM RTA: CPT

## 2022-11-22 PROCEDURE — 92014 COMPRE OPH EXAM EST PT 1/>: CPT | Mod: 25

## 2022-11-22 PROCEDURE — 67028 INJECTION EYE DRUG: CPT

## 2022-11-22 PROCEDURE — PFS EYLEA PFS

## 2022-11-22 PROCEDURE — 92202 OPSCPY EXTND ON/MAC DRAW: CPT | Mod: 59

## 2022-11-22 ASSESSMENT — TONOMETRY
OS_IOP_MMHG: 17
OD_IOP_MMHG: 16

## 2022-11-22 ASSESSMENT — VISUAL ACUITY
OD_PH: 20/50-1
OD_SC: 20/60-2
OS_SC: 20/25-1

## 2022-12-08 ENCOUNTER — PROBLEM (OUTPATIENT)
Dept: URBAN - METROPOLITAN AREA CLINIC 91 | Facility: CLINIC | Age: 87
End: 2022-12-08

## 2022-12-08 DIAGNOSIS — H43.393: ICD-10-CM

## 2022-12-08 DIAGNOSIS — H35.61: ICD-10-CM

## 2022-12-08 DIAGNOSIS — H35.3122: ICD-10-CM

## 2022-12-08 DIAGNOSIS — H34.8320: ICD-10-CM

## 2022-12-08 DIAGNOSIS — H35.3211: ICD-10-CM

## 2022-12-08 DIAGNOSIS — D31.31: ICD-10-CM

## 2022-12-08 PROCEDURE — 92014 COMPRE OPH EXAM EST PT 1/>: CPT

## 2022-12-08 PROCEDURE — 92134 CPTRZ OPH DX IMG PST SGM RTA: CPT

## 2022-12-08 PROCEDURE — 92201 OPSCPY EXTND RTA DRAW UNI/BI: CPT

## 2022-12-08 ASSESSMENT — TONOMETRY
OS_IOP_MMHG: 11
OD_IOP_MMHG: 10

## 2022-12-08 ASSESSMENT — VISUAL ACUITY
OD_PH: 20/50-2
OD_SC: 20/70-3
OS_SC: 20/30-1

## 2022-12-29 ENCOUNTER — FOLLOW UP (OUTPATIENT)
Dept: URBAN - METROPOLITAN AREA CLINIC 91 | Facility: CLINIC | Age: 87
End: 2022-12-29

## 2022-12-29 DIAGNOSIS — H43.393: ICD-10-CM

## 2022-12-29 DIAGNOSIS — H35.61: ICD-10-CM

## 2022-12-29 DIAGNOSIS — D31.31: ICD-10-CM

## 2022-12-29 DIAGNOSIS — H35.3122: ICD-10-CM

## 2022-12-29 DIAGNOSIS — H34.8320: ICD-10-CM

## 2022-12-29 DIAGNOSIS — H35.3211: ICD-10-CM

## 2022-12-29 PROCEDURE — 92134 CPTRZ OPH DX IMG PST SGM RTA: CPT

## 2022-12-29 PROCEDURE — PFS EYLEA PFS

## 2022-12-29 PROCEDURE — 67028 INJECTION EYE DRUG: CPT

## 2022-12-29 PROCEDURE — 92014 COMPRE OPH EXAM EST PT 1/>: CPT | Mod: 25

## 2022-12-29 PROCEDURE — 92202 OPSCPY EXTND ON/MAC DRAW: CPT | Mod: 59

## 2022-12-29 ASSESSMENT — TONOMETRY
OS_IOP_MMHG: 15
OD_IOP_MMHG: 15

## 2022-12-29 ASSESSMENT — VISUAL ACUITY
OS_SC: 20/30-2
OD_SC: 20/70-3

## 2023-01-26 ENCOUNTER — FOLLOW UP (OUTPATIENT)
Dept: URBAN - METROPOLITAN AREA CLINIC 91 | Facility: CLINIC | Age: 88
End: 2023-01-26

## 2023-01-26 DIAGNOSIS — D31.31: ICD-10-CM

## 2023-01-26 DIAGNOSIS — H35.3211: ICD-10-CM

## 2023-01-26 DIAGNOSIS — H34.8320: ICD-10-CM

## 2023-01-26 DIAGNOSIS — H43.393: ICD-10-CM

## 2023-01-26 DIAGNOSIS — H35.61: ICD-10-CM

## 2023-01-26 DIAGNOSIS — H35.3122: ICD-10-CM

## 2023-01-26 PROCEDURE — 92134 CPTRZ OPH DX IMG PST SGM RTA: CPT

## 2023-01-26 PROCEDURE — 67028 INJECTION EYE DRUG: CPT

## 2023-01-26 PROCEDURE — PFS EYLEA PFS

## 2023-01-26 PROCEDURE — 92014 COMPRE OPH EXAM EST PT 1/>: CPT | Mod: 25

## 2023-01-26 PROCEDURE — 92201 OPSCPY EXTND RTA DRAW UNI/BI: CPT | Mod: 59

## 2023-01-26 PROCEDURE — 92235 FLUORESCEIN ANGRPH MLTIFRAME: CPT

## 2023-01-26 ASSESSMENT — VISUAL ACUITY
OD_PH: 20/30-1
OS_SC: 20/30-1
OD_SC: 20/50-1

## 2023-01-26 ASSESSMENT — TONOMETRY
OD_IOP_MMHG: 14
OS_IOP_MMHG: 12

## 2023-02-23 ENCOUNTER — FOLLOW UP (OUTPATIENT)
Dept: URBAN - METROPOLITAN AREA CLINIC 91 | Facility: CLINIC | Age: 88
End: 2023-02-23

## 2023-02-23 DIAGNOSIS — H35.3122: ICD-10-CM

## 2023-02-23 DIAGNOSIS — H34.8320: ICD-10-CM

## 2023-02-23 DIAGNOSIS — H35.3211: ICD-10-CM

## 2023-02-23 PROCEDURE — PFS EYLEA PFS

## 2023-02-23 PROCEDURE — 92014 COMPRE OPH EXAM EST PT 1/>: CPT | Mod: 25

## 2023-02-23 PROCEDURE — 92202 OPSCPY EXTND ON/MAC DRAW: CPT | Mod: 59

## 2023-02-23 PROCEDURE — 92134 CPTRZ OPH DX IMG PST SGM RTA: CPT

## 2023-02-23 PROCEDURE — 67028 INJECTION EYE DRUG: CPT

## 2023-02-23 ASSESSMENT — VISUAL ACUITY
OD_SC: 20/50+1
OS_SC: 20/40-1

## 2023-02-23 ASSESSMENT — TONOMETRY
OS_IOP_MMHG: 15
OD_IOP_MMHG: 14

## 2023-03-07 ENCOUNTER — FOLLOW UP (OUTPATIENT)
Dept: URBAN - METROPOLITAN AREA CLINIC 91 | Facility: CLINIC | Age: 88
End: 2023-03-07

## 2023-03-07 DIAGNOSIS — H34.8320: ICD-10-CM

## 2023-03-07 DIAGNOSIS — H35.3211: ICD-10-CM

## 2023-03-07 DIAGNOSIS — H35.3122: ICD-10-CM

## 2023-03-07 DIAGNOSIS — H43.393: ICD-10-CM

## 2023-03-07 PROCEDURE — 92134 CPTRZ OPH DX IMG PST SGM RTA: CPT

## 2023-03-07 PROCEDURE — 92014 COMPRE OPH EXAM EST PT 1/>: CPT

## 2023-03-07 PROCEDURE — 92201 OPSCPY EXTND RTA DRAW UNI/BI: CPT

## 2023-03-07 ASSESSMENT — TONOMETRY
OS_IOP_MMHG: 13
OD_IOP_MMHG: 12

## 2023-03-07 ASSESSMENT — VISUAL ACUITY
OS_SC: 20/60+2
OD_SC: 20/50-2

## 2023-03-15 ENCOUNTER — PROBLEM (OUTPATIENT)
Dept: URBAN - METROPOLITAN AREA CLINIC 91 | Facility: CLINIC | Age: 88
End: 2023-03-15

## 2023-03-15 DIAGNOSIS — H35.61: ICD-10-CM

## 2023-03-15 DIAGNOSIS — H34.8320: ICD-10-CM

## 2023-03-15 DIAGNOSIS — H43.393: ICD-10-CM

## 2023-03-15 DIAGNOSIS — H35.3122: ICD-10-CM

## 2023-03-15 DIAGNOSIS — D31.31: ICD-10-CM

## 2023-03-15 DIAGNOSIS — H35.3211: ICD-10-CM

## 2023-03-15 PROCEDURE — 92134 CPTRZ OPH DX IMG PST SGM RTA: CPT

## 2023-03-15 PROCEDURE — 92201 OPSCPY EXTND RTA DRAW UNI/BI: CPT

## 2023-03-15 PROCEDURE — 92014 COMPRE OPH EXAM EST PT 1/>: CPT

## 2023-03-15 ASSESSMENT — TONOMETRY
OS_IOP_MMHG: 12
OD_IOP_MMHG: 15

## 2023-03-15 ASSESSMENT — VISUAL ACUITY
OS_SC: 20/30-2
OD_PH: 20/30-2
OD_SC: 20/60-1

## 2023-03-23 ENCOUNTER — FOLLOW UP (OUTPATIENT)
Dept: URBAN - METROPOLITAN AREA CLINIC 91 | Facility: CLINIC | Age: 88
End: 2023-03-23

## 2023-03-23 DIAGNOSIS — H35.61: ICD-10-CM

## 2023-03-23 DIAGNOSIS — D31.31: ICD-10-CM

## 2023-03-23 DIAGNOSIS — H34.8320: ICD-10-CM

## 2023-03-23 DIAGNOSIS — H35.3122: ICD-10-CM

## 2023-03-23 DIAGNOSIS — H43.393: ICD-10-CM

## 2023-03-23 DIAGNOSIS — H35.3211: ICD-10-CM

## 2023-03-23 PROCEDURE — PFS EYLEA PFS

## 2023-03-23 PROCEDURE — 67028 INJECTION EYE DRUG: CPT

## 2023-03-23 PROCEDURE — 92134 CPTRZ OPH DX IMG PST SGM RTA: CPT

## 2023-03-23 PROCEDURE — 92014 COMPRE OPH EXAM EST PT 1/>: CPT | Mod: 25

## 2023-03-23 PROCEDURE — 92202 OPSCPY EXTND ON/MAC DRAW: CPT | Mod: 59

## 2023-03-23 ASSESSMENT — TONOMETRY
OS_IOP_MMHG: 14
OD_IOP_MMHG: 12

## 2023-03-23 ASSESSMENT — VISUAL ACUITY
OD_PH: 20/30
OS_SC: 20/30+1
OD_SC: 20/50+1

## 2023-04-20 ENCOUNTER — FOLLOW UP (OUTPATIENT)
Dept: URBAN - METROPOLITAN AREA CLINIC 91 | Facility: CLINIC | Age: 88
End: 2023-04-20

## 2023-04-20 DIAGNOSIS — H43.393: ICD-10-CM

## 2023-04-20 DIAGNOSIS — H34.8320: ICD-10-CM

## 2023-04-20 DIAGNOSIS — D31.31: ICD-10-CM

## 2023-04-20 DIAGNOSIS — H35.3122: ICD-10-CM

## 2023-04-20 DIAGNOSIS — H35.3211: ICD-10-CM

## 2023-04-20 DIAGNOSIS — H35.61: ICD-10-CM

## 2023-04-20 PROCEDURE — 92014 COMPRE OPH EXAM EST PT 1/>: CPT | Mod: 25

## 2023-04-20 PROCEDURE — 92134 CPTRZ OPH DX IMG PST SGM RTA: CPT

## 2023-04-20 PROCEDURE — 92202 OPSCPY EXTND ON/MAC DRAW: CPT | Mod: 59

## 2023-04-20 PROCEDURE — 67028 INJECTION EYE DRUG: CPT

## 2023-04-20 ASSESSMENT — TONOMETRY
OS_IOP_MMHG: 13
OD_IOP_MMHG: 13

## 2023-04-20 ASSESSMENT — VISUAL ACUITY
OD_SC: 20/60-2
OS_SC: 20/40+1

## 2023-05-04 ENCOUNTER — FOLLOW UP (OUTPATIENT)
Dept: URBAN - METROPOLITAN AREA CLINIC 91 | Facility: CLINIC | Age: 88
End: 2023-05-04

## 2023-05-04 DIAGNOSIS — H34.8320: ICD-10-CM

## 2023-05-04 DIAGNOSIS — H43.393: ICD-10-CM

## 2023-05-04 DIAGNOSIS — H35.3122: ICD-10-CM

## 2023-05-04 DIAGNOSIS — H18.20: ICD-10-CM

## 2023-05-04 PROCEDURE — 92014 COMPRE OPH EXAM EST PT 1/>: CPT

## 2023-05-04 PROCEDURE — 92202 OPSCPY EXTND ON/MAC DRAW: CPT

## 2023-05-04 PROCEDURE — 92134 CPTRZ OPH DX IMG PST SGM RTA: CPT

## 2023-05-04 ASSESSMENT — TONOMETRY
OS_IOP_MMHG: 12
OD_IOP_MMHG: 11

## 2023-05-04 ASSESSMENT — VISUAL ACUITY
OD_SC: 20/60-2
OS_SC: 20/70-2

## 2023-05-18 ENCOUNTER — FOLLOW UP (OUTPATIENT)
Dept: URBAN - METROPOLITAN AREA CLINIC 91 | Facility: CLINIC | Age: 88
End: 2023-05-18

## 2023-05-18 DIAGNOSIS — H35.3122: ICD-10-CM

## 2023-05-18 DIAGNOSIS — H35.3211: ICD-10-CM

## 2023-05-18 PROCEDURE — 92014 COMPRE OPH EXAM EST PT 1/>: CPT | Mod: 25

## 2023-05-18 PROCEDURE — 92134 CPTRZ OPH DX IMG PST SGM RTA: CPT

## 2023-05-18 PROCEDURE — 67028 INJECTION EYE DRUG: CPT

## 2023-05-18 PROCEDURE — 92202 OPSCPY EXTND ON/MAC DRAW: CPT | Mod: 59

## 2023-05-18 ASSESSMENT — VISUAL ACUITY
OS_SC: 20/30-2
OD_PH: 20/40+2
OD_SC: 20/60+2

## 2023-05-18 ASSESSMENT — TONOMETRY
OD_IOP_MMHG: 11
OS_IOP_MMHG: 10

## 2023-06-20 ENCOUNTER — FOLLOW UP (OUTPATIENT)
Dept: URBAN - METROPOLITAN AREA CLINIC 91 | Facility: CLINIC | Age: 88
End: 2023-06-20

## 2023-06-20 DIAGNOSIS — H35.3122: ICD-10-CM

## 2023-06-20 DIAGNOSIS — H35.3211: ICD-10-CM

## 2023-06-20 PROCEDURE — 67028 INJECTION EYE DRUG: CPT

## 2023-06-20 PROCEDURE — 92134 CPTRZ OPH DX IMG PST SGM RTA: CPT

## 2023-06-20 PROCEDURE — 92014 COMPRE OPH EXAM EST PT 1/>: CPT | Mod: 25

## 2023-06-20 PROCEDURE — 92202 OPSCPY EXTND ON/MAC DRAW: CPT | Mod: 59

## 2023-06-20 ASSESSMENT — TONOMETRY
OS_IOP_MMHG: 15
OD_IOP_MMHG: 15

## 2023-06-20 ASSESSMENT — VISUAL ACUITY
OD_SC: 20/50
OS_SC: 20/30

## 2023-08-01 ENCOUNTER — FOLLOW UP (OUTPATIENT)
Dept: URBAN - METROPOLITAN AREA CLINIC 91 | Facility: CLINIC | Age: 88
End: 2023-08-01

## 2023-08-01 DIAGNOSIS — H43.393: ICD-10-CM

## 2023-08-01 DIAGNOSIS — H35.3211: ICD-10-CM

## 2023-08-01 DIAGNOSIS — H35.61: ICD-10-CM

## 2023-08-01 DIAGNOSIS — H35.3122: ICD-10-CM

## 2023-08-01 DIAGNOSIS — H34.8320: ICD-10-CM

## 2023-08-01 PROCEDURE — 92014 COMPRE OPH EXAM EST PT 1/>: CPT | Mod: 25

## 2023-08-01 PROCEDURE — 92202 OPSCPY EXTND ON/MAC DRAW: CPT | Mod: 59

## 2023-08-01 PROCEDURE — 92134 CPTRZ OPH DX IMG PST SGM RTA: CPT

## 2023-08-01 PROCEDURE — 67028 INJECTION EYE DRUG: CPT

## 2023-08-01 ASSESSMENT — VISUAL ACUITY
OS_SC: 20/50
OD_PH: 20/40
OS_PH: 20/30
OD_SC: 20/50

## 2023-08-01 ASSESSMENT — TONOMETRY
OD_IOP_MMHG: 11
OS_IOP_MMHG: 10

## 2023-08-15 ENCOUNTER — PROCEDURE ONLY (OUTPATIENT)
Dept: URBAN - METROPOLITAN AREA CLINIC 91 | Facility: CLINIC | Age: 88
End: 2023-08-15

## 2023-08-15 DIAGNOSIS — H34.8320: ICD-10-CM

## 2023-08-15 PROCEDURE — PFS EYLEA PFS: Mod: JZ

## 2023-08-15 PROCEDURE — 67028 INJECTION EYE DRUG: CPT

## 2023-08-15 ASSESSMENT — VISUAL ACUITY: OS_SC: 20/40

## 2023-08-15 ASSESSMENT — TONOMETRY: OS_IOP_MMHG: 13

## 2023-09-19 ENCOUNTER — FOLLOW UP (OUTPATIENT)
Dept: URBAN - METROPOLITAN AREA CLINIC 91 | Facility: CLINIC | Age: 88
End: 2023-09-19

## 2023-09-19 DIAGNOSIS — H34.8320: ICD-10-CM

## 2023-09-19 DIAGNOSIS — H35.3211: ICD-10-CM

## 2023-09-19 DIAGNOSIS — H35.61: ICD-10-CM

## 2023-09-19 DIAGNOSIS — H35.3122: ICD-10-CM

## 2023-09-19 PROCEDURE — 92202 OPSCPY EXTND ON/MAC DRAW: CPT | Mod: 59

## 2023-09-19 PROCEDURE — 67028 INJECTION EYE DRUG: CPT

## 2023-09-19 PROCEDURE — 92134 CPTRZ OPH DX IMG PST SGM RTA: CPT

## 2023-09-19 PROCEDURE — 92014 COMPRE OPH EXAM EST PT 1/>: CPT | Mod: 25

## 2023-09-19 ASSESSMENT — VISUAL ACUITY
OS_SC: 20/40+2
OD_SC: 20/50

## 2023-09-19 ASSESSMENT — TONOMETRY
OS_IOP_MMHG: 13
OD_IOP_MMHG: 13

## 2023-11-21 ENCOUNTER — FOLLOW UP (OUTPATIENT)
Dept: URBAN - METROPOLITAN AREA CLINIC 91 | Facility: CLINIC | Age: 88
End: 2023-11-21

## 2023-11-21 DIAGNOSIS — H34.8320: ICD-10-CM

## 2023-11-21 DIAGNOSIS — H35.3122: ICD-10-CM

## 2023-11-21 DIAGNOSIS — H35.61: ICD-10-CM

## 2023-11-21 DIAGNOSIS — H35.3211: ICD-10-CM

## 2023-11-21 PROCEDURE — 67028 INJECTION EYE DRUG: CPT

## 2023-11-21 PROCEDURE — 92134 CPTRZ OPH DX IMG PST SGM RTA: CPT

## 2023-11-21 PROCEDURE — 92014 COMPRE OPH EXAM EST PT 1/>: CPT | Mod: 25

## 2023-11-21 PROCEDURE — 92202 OPSCPY EXTND ON/MAC DRAW: CPT | Mod: 59

## 2023-11-21 ASSESSMENT — VISUAL ACUITY
OD_SC: 20/80-2
OS_SC: 20/30-1
OD_PH: 20/70-1

## 2023-11-21 ASSESSMENT — TONOMETRY
OS_IOP_MMHG: 14
OD_IOP_MMHG: 15

## 2024-01-02 ENCOUNTER — FOLLOW UP (OUTPATIENT)
Dept: URBAN - METROPOLITAN AREA CLINIC 91 | Facility: CLINIC | Age: 89
End: 2024-01-02

## 2024-01-02 DIAGNOSIS — H43.393: ICD-10-CM

## 2024-01-02 DIAGNOSIS — H35.3122: ICD-10-CM

## 2024-01-02 DIAGNOSIS — H34.8320: ICD-10-CM

## 2024-01-02 DIAGNOSIS — H35.3211: ICD-10-CM

## 2024-01-02 DIAGNOSIS — H35.61: ICD-10-CM

## 2024-01-02 DIAGNOSIS — D31.31: ICD-10-CM

## 2024-01-02 PROCEDURE — 92201 OPSCPY EXTND RTA DRAW UNI/BI: CPT | Mod: 59

## 2024-01-02 PROCEDURE — 92014 COMPRE OPH EXAM EST PT 1/>: CPT | Mod: 25

## 2024-01-02 PROCEDURE — 92134 CPTRZ OPH DX IMG PST SGM RTA: CPT

## 2024-01-02 PROCEDURE — 92235 FLUORESCEIN ANGRPH MLTIFRAME: CPT

## 2024-01-02 PROCEDURE — 67028 INJECTION EYE DRUG: CPT

## 2024-01-02 ASSESSMENT — VISUAL ACUITY
OS_SC: 20/30-2
OD_SC: 20/50

## 2024-01-02 ASSESSMENT — TONOMETRY
OD_IOP_MMHG: 16
OS_IOP_MMHG: 13

## 2024-02-20 ENCOUNTER — INJECTION ONLY (OUTPATIENT)
Dept: URBAN - METROPOLITAN AREA CLINIC 91 | Facility: CLINIC | Age: 89
End: 2024-02-20

## 2024-02-20 DIAGNOSIS — H35.3211: ICD-10-CM

## 2024-02-20 DIAGNOSIS — H35.3122: ICD-10-CM

## 2024-02-20 PROCEDURE — 67028 INJECTION EYE DRUG: CPT

## 2024-02-20 PROCEDURE — 92134 CPTRZ OPH DX IMG PST SGM RTA: CPT

## 2024-02-20 ASSESSMENT — TONOMETRY
OS_IOP_MMHG: 14
OD_IOP_MMHG: 18

## 2024-02-20 ASSESSMENT — VISUAL ACUITY
OD_SC: 20/50-2
OS_SC: 20/30
OD_PH: 20/40

## 2024-04-23 ENCOUNTER — INJECTION ONLY (OUTPATIENT)
Dept: URBAN - METROPOLITAN AREA CLINIC 91 | Facility: CLINIC | Age: 89
End: 2024-04-23

## 2024-04-23 DIAGNOSIS — H35.3211: ICD-10-CM

## 2024-04-23 PROCEDURE — 92134 CPTRZ OPH DX IMG PST SGM RTA: CPT

## 2024-04-23 PROCEDURE — 67028 INJECTION EYE DRUG: CPT

## 2024-04-23 ASSESSMENT — TONOMETRY
OD_IOP_MMHG: 12
OS_IOP_MMHG: 12

## 2024-04-23 ASSESSMENT — VISUAL ACUITY
OS_SC: 20/30-2
OD_PH: 20/30-2
OD_SC: 20/40

## 2024-07-02 ENCOUNTER — FOLLOW UP (OUTPATIENT)
Dept: URBAN - METROPOLITAN AREA CLINIC 91 | Facility: CLINIC | Age: 89
End: 2024-07-02

## 2024-07-02 DIAGNOSIS — H35.3211: ICD-10-CM

## 2024-07-02 DIAGNOSIS — H43.393: ICD-10-CM

## 2024-07-02 DIAGNOSIS — D31.31: ICD-10-CM

## 2024-07-02 DIAGNOSIS — H35.3122: ICD-10-CM

## 2024-07-02 PROCEDURE — 92014 COMPRE OPH EXAM EST PT 1/>: CPT | Mod: 25

## 2024-07-02 PROCEDURE — 92134 CPTRZ OPH DX IMG PST SGM RTA: CPT

## 2024-07-02 PROCEDURE — 67028 INJECTION EYE DRUG: CPT

## 2024-07-02 PROCEDURE — 92201 OPSCPY EXTND RTA DRAW UNI/BI: CPT | Mod: 59

## 2024-07-02 ASSESSMENT — VISUAL ACUITY
OS_SC: 20/30
OD_SC: 20/70-3
OD_PH: 20/50

## 2024-07-02 ASSESSMENT — TONOMETRY
OD_IOP_MMHG: 13
OS_IOP_MMHG: 13

## 2024-08-29 ENCOUNTER — INJECTION ONLY (OUTPATIENT)
Dept: URBAN - METROPOLITAN AREA CLINIC 91 | Facility: CLINIC | Age: 89
End: 2024-08-29

## 2024-08-29 DIAGNOSIS — H35.3211: ICD-10-CM

## 2024-08-29 PROCEDURE — 92134 CPTRZ OPH DX IMG PST SGM RTA: CPT

## 2024-08-29 PROCEDURE — 67028 INJECTION EYE DRUG: CPT

## 2024-08-29 ASSESSMENT — VISUAL ACUITY
OD_PH: 20/40
OD_SC: 20/50

## 2024-08-29 ASSESSMENT — TONOMETRY: OD_IOP_MMHG: 12

## 2024-11-15 ENCOUNTER — FOLLOW UP (OUTPATIENT)
Dept: URBAN - METROPOLITAN AREA CLINIC 80 | Facility: CLINIC | Age: 89
End: 2024-11-15

## 2024-11-15 DIAGNOSIS — H35.3211: ICD-10-CM

## 2024-11-15 PROCEDURE — 92134 CPTRZ OPH DX IMG PST SGM RTA: CPT

## 2024-11-15 PROCEDURE — 67028 INJECTION EYE DRUG: CPT

## 2024-11-15 PROCEDURE — 92012 INTRM OPH EXAM EST PATIENT: CPT | Mod: 25

## 2024-11-15 ASSESSMENT — TONOMETRY: OD_IOP_MMHG: 16

## 2024-11-15 ASSESSMENT — VISUAL ACUITY: OD_CC: 20/50-2

## 2024-12-21 NOTE — ED PROVIDER NOTE - PSH
H/O cataract extraction, right    H/O colonoscopy    H/O mitral valve repair  Bioprosthetic mitral valve reolacement.  History of tonsillectomy    S/P thoracentesis pt arrives to ED with c/o nose bleed since last night. pt reports the nose bilat has been runny with little bit of blood, was cauterized by doc, but last night it was pouring out on right side in which pt self packed right side with improvement. no active bleeding at this time. 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 pt showing RN scabs on right/mid forehead 3 circular scabs intertwined, reporting he scratched his head and wanted to know if there was anything to put on these scratches, 2 more similar wounds on right lateral face, near right ear.  pt on plavix, no nosebleeds in past reported.

## 2025-01-03 ENCOUNTER — FOLLOW UP (OUTPATIENT)
Dept: URBAN - METROPOLITAN AREA CLINIC 80 | Facility: CLINIC | Age: OVER 89
End: 2025-01-03

## 2025-01-03 DIAGNOSIS — H35.3211: ICD-10-CM

## 2025-01-03 DIAGNOSIS — H43.393: ICD-10-CM

## 2025-01-03 PROCEDURE — 92235 FLUORESCEIN ANGRPH MLTIFRAME: CPT

## 2025-01-03 PROCEDURE — 67028 INJECTION EYE DRUG: CPT

## 2025-01-03 PROCEDURE — 92201 OPSCPY EXTND RTA DRAW UNI/BI: CPT | Mod: 59

## 2025-01-03 PROCEDURE — 92014 COMPRE OPH EXAM EST PT 1/>: CPT | Mod: 25

## 2025-01-03 PROCEDURE — 92134 CPTRZ OPH DX IMG PST SGM RTA: CPT

## 2025-01-03 ASSESSMENT — VISUAL ACUITY
OS_SC: 20/25
OD_SC: 20/50+2
OD_PH: 20/40+1

## 2025-01-03 ASSESSMENT — TONOMETRY
OS_IOP_MMHG: 18
OD_IOP_MMHG: 15

## 2025-01-09 ENCOUNTER — INJECTION ONLY (OUTPATIENT)
Dept: URBAN - METROPOLITAN AREA CLINIC 91 | Facility: CLINIC | Age: OVER 89
End: 2025-01-09

## 2025-01-09 DIAGNOSIS — H35.3231: ICD-10-CM

## 2025-01-09 PROCEDURE — 67028 INJECTION EYE DRUG: CPT

## 2025-01-09 ASSESSMENT — TONOMETRY: OS_IOP_MMHG: 19

## 2025-01-09 ASSESSMENT — VISUAL ACUITY: OS_SC: 20/30-3

## 2025-02-06 ENCOUNTER — FOLLOW UP (OUTPATIENT)
Dept: URBAN - METROPOLITAN AREA CLINIC 91 | Facility: CLINIC | Age: OVER 89
End: 2025-02-06

## 2025-02-06 DIAGNOSIS — H35.3231: ICD-10-CM

## 2025-02-06 PROCEDURE — 92134 CPTRZ OPH DX IMG PST SGM RTA: CPT

## 2025-02-06 PROCEDURE — 67028 INJECTION EYE DRUG: CPT

## 2025-02-06 PROCEDURE — 92012 INTRM OPH EXAM EST PATIENT: CPT | Mod: 25

## 2025-02-06 ASSESSMENT — VISUAL ACUITY
OS_SC: 20/30-3
OD_SC: 20/50-3
OD_PH: 20/50-2

## 2025-02-06 ASSESSMENT — TONOMETRY
OS_IOP_MMHG: 19
OD_IOP_MMHG: 19

## 2025-02-27 ENCOUNTER — INJECTION ONLY (OUTPATIENT)
Dept: URBAN - METROPOLITAN AREA CLINIC 91 | Facility: CLINIC | Age: OVER 89
End: 2025-02-27

## 2025-02-27 DIAGNOSIS — H35.3231: ICD-10-CM

## 2025-02-27 PROCEDURE — 67028 INJECTION EYE DRUG: CPT

## 2025-02-27 ASSESSMENT — VISUAL ACUITY
OD_SC: 20/40+1
OD_PH: 20/40+2
OS_PH: 20/30+2
OS_SC: 20/30+1

## 2025-02-27 ASSESSMENT — TONOMETRY
OS_IOP_MMHG: 17
OD_IOP_MMHG: 16

## 2025-03-06 ENCOUNTER — INTERMEDIATE FOLLOW-UP (OUTPATIENT)
Dept: URBAN - METROPOLITAN AREA CLINIC 91 | Facility: CLINIC | Age: OVER 89
End: 2025-03-06

## 2025-03-06 DIAGNOSIS — H35.3231: ICD-10-CM

## 2025-03-06 PROCEDURE — 92012 INTRM OPH EXAM EST PATIENT: CPT | Mod: 25

## 2025-03-06 PROCEDURE — 67028 INJECTION EYE DRUG: CPT

## 2025-03-06 PROCEDURE — 92134 CPTRZ OPH DX IMG PST SGM RTA: CPT

## 2025-03-06 ASSESSMENT — VISUAL ACUITY
OS_SC: 20/25+2
OD_SC: 20/50+2
OD_PH: 20/40+1

## 2025-03-06 ASSESSMENT — TONOMETRY
OS_IOP_MMHG: 13
OD_IOP_MMHG: 14

## 2025-04-24 ENCOUNTER — INTERMEDIATE FOLLOW-UP (OUTPATIENT)
Dept: URBAN - METROPOLITAN AREA CLINIC 91 | Facility: CLINIC | Age: OVER 89
End: 2025-04-24

## 2025-04-24 DIAGNOSIS — H35.3231: ICD-10-CM

## 2025-04-24 PROCEDURE — 92134 CPTRZ OPH DX IMG PST SGM RTA: CPT

## 2025-04-24 PROCEDURE — 92014 COMPRE OPH EXAM EST PT 1/>: CPT | Mod: 25

## 2025-04-24 PROCEDURE — 67028 INJECTION EYE DRUG: CPT

## 2025-04-24 PROCEDURE — 92202 OPSCPY EXTND ON/MAC DRAW: CPT | Mod: 59

## 2025-04-24 ASSESSMENT — VISUAL ACUITY
OS_SC: 20/30+2
OD_PH: 20/40
OD_SC: 20/60+2
OS_PH: 20/25-2

## 2025-04-24 ASSESSMENT — TONOMETRY
OS_IOP_MMHG: 12
OD_IOP_MMHG: 14

## 2025-05-01 ENCOUNTER — INJECTION ONLY (OUTPATIENT)
Dept: URBAN - METROPOLITAN AREA CLINIC 91 | Facility: CLINIC | Age: OVER 89
End: 2025-05-01

## 2025-05-01 DIAGNOSIS — H35.3231: ICD-10-CM

## 2025-05-01 PROCEDURE — 67028 INJECTION EYE DRUG: CPT

## 2025-05-01 ASSESSMENT — TONOMETRY
OD_IOP_MMHG: 14
OS_IOP_MMHG: 16

## 2025-05-01 ASSESSMENT — VISUAL ACUITY
OD_SC: 20/40+2
OS_SC: 20/40+1

## 2025-06-19 ENCOUNTER — INJECTION ONLY (OUTPATIENT)
Dept: URBAN - METROPOLITAN AREA CLINIC 91 | Facility: CLINIC | Age: OVER 89
End: 2025-06-19

## 2025-06-19 DIAGNOSIS — H35.3231: ICD-10-CM

## 2025-06-19 PROCEDURE — 92012 INTRM OPH EXAM EST PATIENT: CPT | Mod: 25

## 2025-06-19 PROCEDURE — 92134 CPTRZ OPH DX IMG PST SGM RTA: CPT

## 2025-06-19 PROCEDURE — 67028 INJECTION EYE DRUG: CPT

## 2025-06-19 ASSESSMENT — TONOMETRY
OS_IOP_MMHG: 13
OD_IOP_MMHG: 12

## 2025-06-19 ASSESSMENT — VISUAL ACUITY
OD_SC: 20/50+2
OS_SC: 20/25-2

## 2025-07-17 ENCOUNTER — INTERMEDIATE FOLLOW-UP (OUTPATIENT)
Dept: URBAN - METROPOLITAN AREA CLINIC 91 | Facility: CLINIC | Age: OVER 89
End: 2025-07-17

## 2025-07-17 DIAGNOSIS — H35.3231: ICD-10-CM

## 2025-07-17 PROCEDURE — 67028 INJECTION EYE DRUG: CPT

## 2025-07-17 PROCEDURE — 92134 CPTRZ OPH DX IMG PST SGM RTA: CPT

## 2025-07-17 PROCEDURE — 92012 INTRM OPH EXAM EST PATIENT: CPT | Mod: 25

## 2025-07-17 ASSESSMENT — VISUAL ACUITY
OS_PH: 20/30-1
OD_PH: 20/50-1
OS_SC: 20/40-1
OD_SC: 20/60-1

## 2025-07-17 ASSESSMENT — TONOMETRY
OD_IOP_MMHG: 13
OS_IOP_MMHG: 11

## 2025-08-28 ENCOUNTER — FOLLOW UP (OUTPATIENT)
Dept: URBAN - METROPOLITAN AREA CLINIC 91 | Facility: CLINIC | Age: OVER 89
End: 2025-08-28

## 2025-08-28 DIAGNOSIS — D31.31: ICD-10-CM

## 2025-08-28 DIAGNOSIS — H34.8320: ICD-10-CM

## 2025-08-28 DIAGNOSIS — H43.393: ICD-10-CM

## 2025-08-28 DIAGNOSIS — H35.3231: ICD-10-CM

## 2025-08-28 PROCEDURE — 92134 CPTRZ OPH DX IMG PST SGM RTA: CPT

## 2025-08-28 PROCEDURE — 92201 OPSCPY EXTND RTA DRAW UNI/BI: CPT | Mod: 59

## 2025-08-28 PROCEDURE — 67028 INJECTION EYE DRUG: CPT

## 2025-08-28 PROCEDURE — 92014 COMPRE OPH EXAM EST PT 1/>: CPT | Mod: 25

## 2025-08-28 ASSESSMENT — VISUAL ACUITY
OD_PH: 20/50-1
OS_SC: 20/40+2
OD_SC: 20/60+2

## 2025-08-28 ASSESSMENT — TONOMETRY
OD_IOP_MMHG: 11
OS_IOP_MMHG: 10